# Patient Record
Sex: MALE | Race: WHITE | Employment: FULL TIME | URBAN - METROPOLITAN AREA
[De-identification: names, ages, dates, MRNs, and addresses within clinical notes are randomized per-mention and may not be internally consistent; named-entity substitution may affect disease eponyms.]

---

## 2019-10-20 ENCOUNTER — HOSPITAL ENCOUNTER (EMERGENCY)
Age: 39
Discharge: HOME | End: 2019-10-20
Attending: EMERGENCY MEDICINE
Payer: COMMERCIAL

## 2019-10-20 VITALS
WEIGHT: 190.92 LBS | HEART RATE: 58 BPM | TEMPERATURE: 98 F | OXYGEN SATURATION: 96 % | HEIGHT: 72 IN | SYSTOLIC BLOOD PRESSURE: 148 MMHG | RESPIRATION RATE: 14 BRPM | DIASTOLIC BLOOD PRESSURE: 82 MMHG | BODY MASS INDEX: 25.86 KG/M2

## 2019-10-20 DIAGNOSIS — R42 DIZZY SPELLS: Primary | ICD-10-CM

## 2019-10-20 PROCEDURE — 99282 EMERGENCY DEPT VISIT SF MDM: CPT

## 2019-10-20 RX ORDER — MECLIZINE HYDROCHLORIDE 25 MG/1
25 TABLET ORAL
Qty: 30 TAB | Refills: 0 | Status: SHIPPED | OUTPATIENT
Start: 2019-10-20 | End: 2019-10-30

## 2019-10-20 NOTE — ED TRIAGE NOTES
For the past several years patient has had inner ear issues, was told he had fluid in ear multiple times by [facility]. Last night he reports he reports turning his head to the side and immediately feeling dizzy. He felt the worst in a long time, barely able to walk straight after standing up. He has been placed on sudafed, flonase, and zyrtec with some relief in the past. Prt reports feeling dizzy this morning, feels like lying down helped.  Pt reports no dizziness on arrival.

## 2019-10-20 NOTE — ED PROVIDER NOTES
Date of Service:  10/20/2019    Patient:  Christa Crow    Chief Complaint:  Dizziness       HPI:  Christa Crow is a 44 y.o.  male who presents for evaluation of dizzy spells. Last evening while drinking at a friend's house he experienced sudden onset dizziness lasted for about an hour or 2 before remitting on its own. But that he had some nausea. He describes it as his previous episodes of vertigo in the past.  Patient also notes that he has had issues with fluid behind his left eardrum and believes that there is some now as he sometimes hears his pulse in his ear and he feels crispy sound when he rubs his ear. Patient denies any headache change in vision change in hearing. He has no symptoms now. He has no chest pain shortness of breath or abdominal pain. He denies any head trauma. No recent illness specifically no type of chest or head cold. He otherwise denies any other acute complaints or modifying factors. History reviewed. No pertinent past medical history. History reviewed. No pertinent surgical history. History reviewed. No pertinent family history.     Social History     Socioeconomic History    Marital status: Not on file     Spouse name: Not on file    Number of children: Not on file    Years of education: Not on file    Highest education level: Not on file   Occupational History    Not on file   Social Needs    Financial resource strain: Not on file    Food insecurity:     Worry: Not on file     Inability: Not on file    Transportation needs:     Medical: Not on file     Non-medical: Not on file   Tobacco Use    Smoking status: Former Smoker    Smokeless tobacco: Never Used   Substance and Sexual Activity    Alcohol use: Yes     Comment: occasional    Drug use: Never    Sexual activity: Not on file   Lifestyle    Physical activity:     Days per week: Not on file     Minutes per session: Not on file    Stress: Not on file   Relationships    Social connections: Talks on phone: Not on file     Gets together: Not on file     Attends [employer] service: Not on file     Active member of club or organization: Not on file     Attends meetings of clubs or organizations: Not on file     Relationship status: Not on file    Intimate partner violence:     Fear of current or ex partner: Not on file     Emotionally abused: Not on file     Physically abused: Not on file     Forced sexual activity: Not on file   Other Topics Concern    Not on file   Social History Narrative    Not on file         ALLERGIES: Patient has no known allergies. Review of Systems   Constitutional: Negative for fever. HENT: Negative for congestion, ear discharge, ear pain, hearing loss, postnasal drip, rhinorrhea, sinus pain, sore throat, trouble swallowing and voice change. Eyes: Negative for visual disturbance. Respiratory: Negative for shortness of breath. Cardiovascular: Negative for chest pain. Gastrointestinal: Negative for abdominal pain. Genitourinary: Negative for flank pain. Musculoskeletal: Negative for back pain. Skin: Negative for rash. Neurological: Positive for dizziness. Negative for speech difficulty, light-headedness and headaches. Psychiatric/Behavioral: Negative for suicidal ideas. Vitals:    10/20/19 1308   BP: 148/82   Pulse: (!) 58   Resp: 14   Temp: 98 °F (36.7 °C)   SpO2: 96%   Weight: 86.6 kg (190 lb 14.7 oz)   Height: 6' (1.829 m)            Physical Exam   Constitutional: He is oriented to person, place, and time. He appears well-developed and well-nourished. No distress. HENT:   Head: Normocephalic and atraumatic. Right Ear: External ear normal.   Mouth/Throat: Oropharynx is clear and moist.   Left tympanic membrane and external canal are grossly unremarkable. There are signs of fluid behind the eardrum   Eyes: Pupils are equal, round, and reactive to light. Conjunctivae and EOM are normal. Right eye exhibits no discharge.  Left eye exhibits no discharge. No scleral icterus. Neck: Neck supple. Cardiovascular: Normal rate and regular rhythm. Pulmonary/Chest: Effort normal and breath sounds normal. No respiratory distress. Abdominal: Soft. There is no tenderness. Musculoskeletal: Normal range of motion. He exhibits no edema, tenderness or deformity. Neurological: He is alert and oriented to person, place, and time. No cranial nerve deficit. Coordination normal.   Skin: Skin is warm and dry. Capillary refill takes less than 2 seconds. No rash noted. Psychiatric: He has a normal mood and affect. Vitals reviewed. MDM  Number of Diagnoses or Management Options  Dizzy spells:     VITAL SIGNS:  Patient Vitals for the past 4 hrs:   Temp Pulse Resp BP SpO2   10/20/19 1308 98 °F (36.7 °C) (!) 58 14 148/82 96 %         LABS:  No results found for this or any previous visit (from the past 6 hour(s)). IMAGING:  No orders to display         Medications During Visit:  Medications - No data to display      DECISION MAKING:  Ochoa Cornejo is a 44 y.o. male who comes in as above. Here, patient is asymptomatic. His neuro exam does not show any type of cerebellar signs. Based on his story, his physical exam findings and his history I believe the most likely diagnosis is that of intermittent vertigo given his previous history of the same and similar symptoms. At this time patient is symptom-free. I will provide him a prescription for meclizine that he can take as needed. I provided him information to primary care doctor for follow-up as well as ear nose and throat follow-up. At this time patient is agreeable to follow-up and return as needed. All questions were answered. Patient is hemodynamically stable with a normal neurological exam at the time of disposition      IMPRESSION:  1.  Dizzy spells        DISPOSITION:  Discharged      Current Discharge Medication List      START taking these medications    Details   meclizine (ANTIVERT) 25 mg tablet Take 1 Tab by mouth three (3) times daily as needed for Dizziness for up to 10 days. Qty: 30 Tab, Refills: 0              Follow-up Information     Follow up With Specialties Details Why [facility] [facility]  Schedule an appointment as soon as possible for a visit   [address] [place] [address] Tess Hickman MD Otolaryngology Call  As needed [address] [place] [address]  [phone]              The patient is asked to follow-up with their primary care provider in the next several days. They are to call tomorrow for an appointment. The patient is asked to return promptly for any increased concerns or worsening of symptoms. They can return to this emergency department or any other emergency department.          Procedures

## 2022-07-20 ENCOUNTER — HOSPITAL ENCOUNTER (EMERGENCY)
Age: 42
Discharge: HOME | End: 2022-07-20
Attending: EMERGENCY MEDICINE
Payer: COMMERCIAL

## 2022-07-20 VITALS
TEMPERATURE: 98.6 F | BODY MASS INDEX: 25.73 KG/M2 | OXYGEN SATURATION: 98 % | SYSTOLIC BLOOD PRESSURE: 108 MMHG | HEIGHT: 72 IN | HEART RATE: 52 BPM | DIASTOLIC BLOOD PRESSURE: 62 MMHG | RESPIRATION RATE: 16 BRPM | WEIGHT: 190 LBS

## 2022-07-20 DIAGNOSIS — G43.809 VESTIBULAR MIGRAINE: Primary | ICD-10-CM

## 2022-07-20 PROCEDURE — 99284 EMERGENCY DEPT VISIT MOD MDM: CPT

## 2022-07-20 PROCEDURE — 96374 THER/PROPH/DIAG INJ IV PUSH: CPT

## 2022-07-20 PROCEDURE — 96375 TX/PRO/DX INJ NEW DRUG ADDON: CPT

## 2022-07-20 PROCEDURE — 74011250636 HC RX REV CODE- 250/636: Performed by: EMERGENCY MEDICINE

## 2022-07-20 RX ORDER — METOCLOPRAMIDE HYDROCHLORIDE 5 MG/ML
10 INJECTION INTRAMUSCULAR; INTRAVENOUS
Status: COMPLETED | OUTPATIENT
Start: 2022-07-20 | End: 2022-07-20

## 2022-07-20 RX ORDER — KETOROLAC TROMETHAMINE 30 MG/ML
15 INJECTION, SOLUTION INTRAMUSCULAR; INTRAVENOUS
Status: COMPLETED | OUTPATIENT
Start: 2022-07-20 | End: 2022-07-20

## 2022-07-20 RX ORDER — DEXAMETHASONE SODIUM PHOSPHATE 4 MG/ML
10 INJECTION, SOLUTION INTRA-ARTICULAR; INTRALESIONAL; INTRAMUSCULAR; INTRAVENOUS; SOFT TISSUE
Status: COMPLETED | OUTPATIENT
Start: 2022-07-20 | End: 2022-07-20

## 2022-07-20 RX ORDER — DIPHENHYDRAMINE HYDROCHLORIDE 50 MG/ML
25 INJECTION, SOLUTION INTRAMUSCULAR; INTRAVENOUS ONCE
Status: COMPLETED | OUTPATIENT
Start: 2022-07-20 | End: 2022-07-20

## 2022-07-20 RX ADMIN — METOCLOPRAMIDE 10 MG: 5 INJECTION, SOLUTION INTRAMUSCULAR; INTRAVENOUS at 11:12

## 2022-07-20 RX ADMIN — DEXAMETHASONE SODIUM PHOSPHATE 10 MG: 4 INJECTION, SOLUTION INTRAMUSCULAR; INTRAVENOUS at 11:10

## 2022-07-20 RX ADMIN — KETOROLAC TROMETHAMINE 15 MG: 30 INJECTION, SOLUTION INTRAMUSCULAR at 11:09

## 2022-07-20 RX ADMIN — SODIUM CHLORIDE 1000 ML: 9 INJECTION, SOLUTION INTRAVENOUS at 11:05

## 2022-07-20 RX ADMIN — DIPHENHYDRAMINE HYDROCHLORIDE 25 MG: 50 INJECTION, SOLUTION INTRAMUSCULAR; INTRAVENOUS at 11:08

## 2022-07-20 NOTE — ED PROVIDER NOTES
19-year-old gentleman with a history of vertigo and diagnosis of vestibular migraines, presents to the emergency department stating that he has been evaluated by neurology and ENT multiple times over the last few years and has had intermittent episodes of dizziness with associated occasional nausea as well as a pressure-like sensation in the left side of his head. He states that he has had a return of symptoms identical to previous episodes that started on Monday. He states that Sunday he felt like he got a bug in his ear but was able to get it out but then his symptoms started on Monday. He denies any other trauma to the area, no fever, cough or cold symptoms. He denies any vision changes, numbness or weakness. He states that he called his neurologist and was told to present to the emergency department for a IV migraine cocktail in an effort to alleviate his symptoms. He states that the last time he had an episode like this that lasted for about a week and then resolved. He states that he has had prior neuroimaging which was normal.  He states that he has never had a migraine cocktail previously. History reviewed. No pertinent past medical history. History reviewed. No pertinent surgical history. History reviewed. No pertinent family history.     Social History     Socioeconomic History    Marital status:      Spouse name: Not on file    Number of children: Not on file    Years of education: Not on file    Highest education level: Not on file   Occupational History    Not on file   Tobacco Use    Smoking status: Former    Smokeless tobacco: Never   Substance and Sexual Activity    Alcohol use: Yes     Comment: occasional    Drug use: Never    Sexual activity: Not on file   Other Topics Concern    Not on file   Social History Narrative    Not on file     Social Determinants of Health     Financial Resource Strain: Not on file   Food Insecurity: Not on file   Transportation Needs: Not on file   Physical Activity: Not on file   Stress: Not on file   Social Connections: Not on file   Intimate Partner Violence: Not on file   Housing Stability: Not on file         ALLERGIES: Patient has no known allergies. Review of Systems   Constitutional:  Negative for activity change, appetite change, chills and fever. HENT:  Negative for congestion, rhinorrhea, sinus pain, sneezing and sore throat. Eyes:  Negative for photophobia and visual disturbance. Respiratory:  Negative for cough and shortness of breath. Cardiovascular:  Negative for chest pain. Gastrointestinal:  Negative for abdominal pain, blood in stool, constipation, diarrhea, nausea and vomiting. Genitourinary:  Negative for difficulty urinating, dysuria, flank pain, hematuria, penile pain and testicular pain. Musculoskeletal:  Negative for arthralgias, back pain, myalgias and neck pain. Skin:  Negative for rash and wound. Neurological:  Positive for dizziness and headaches. Negative for syncope, weakness, light-headedness and numbness. Psychiatric/Behavioral:  Negative for self-injury and suicidal ideas. All other systems reviewed and are negative. Vitals:    07/20/22 1037 07/20/22 1038   BP:  118/77   Pulse:  (!) 50   Resp:  16   Temp:  98.6 °F (37 °C)   SpO2:  98%   Weight: 86.2 kg (190 lb)    Height: 6' (1.829 m)             Physical Exam  Vitals and nursing note reviewed. Constitutional:       General: He is not in acute distress. Appearance: Normal appearance. He is well-developed. He is not diaphoretic. Comments: Very pleasant, no acute distress. HENT:      Head: Normocephalic and atraumatic. Right Ear: Ear canal normal.      Left Ear: Ear canal normal.      Ears:      Comments: left ear mid ear effusion, but no evidence of erythema or significant bulging or perforation. Normal right TM. Nose: Nose normal.   Eyes:      Extraocular Movements: Extraocular movements intact. Conjunctiva/sclera: Conjunctivae normal.      Pupils: Pupils are equal, round, and reactive to light. Cardiovascular:      Rate and Rhythm: Normal rate and regular rhythm. Heart sounds: Normal heart sounds. Pulmonary:      Effort: Pulmonary effort is normal.      Breath sounds: Normal breath sounds. Abdominal:      General: There is no distension. Palpations: Abdomen is soft. Tenderness: There is no abdominal tenderness. Musculoskeletal:         General: No tenderness. Cervical back: Neck supple. Skin:     General: Skin is warm and dry. Neurological:      General: No focal deficit present. Mental Status: He is alert and oriented to person, place, and time. Cranial Nerves: No cranial nerve deficit. Sensory: No sensory deficit. Motor: No weakness. Coordination: Coordination normal.      Comments: Intact sensation, 5/5 strength in all 4 extremities, intact finger to nose, neg pronator drift, fluent speech, CN intact. Negative Charlie-Hallpike bilaterally. MDM  79-year-old male with history of vestibular migraines and vertigo presents with the same for migraine cocktail referred by his neurologist.  He is otherwise neuro intact and well-appearing, afebrile with vital signs stable. He was given IV migraine cocktail and was reassessed and found to have improvement in his symptoms. He was recommended ENT and neurology follow-up for further evaluation and return precautions were given for worsening or concerns. This plan was discussed with the patient and his wife at the bedside and they stated both understanding and agreement. Please note that this dictation was completed with [employer], the computer voice recognition software. Quite often unanticipated grammatical, syntax, homophones, and other interpretive errors are inadvertently transcribed by the computer software. Please disregard these errors.   Please excuse any errors that have escaped final proofreading.          Procedures

## 2022-07-20 NOTE — ED TRIAGE NOTES
PT dx in May with vestibular migraines; pt has had \"off balance feeling\" since Monday, pts neurologist sent him to get an IV; Speech clear, tongue Midline, gait steady, face symmetrical, PEERL, A&0x4.   equal no deficits noted; no drift noted upper and lower ext and denied headache and pain; NIH 0